# Patient Record
Sex: FEMALE | URBAN - METROPOLITAN AREA
[De-identification: names, ages, dates, MRNs, and addresses within clinical notes are randomized per-mention and may not be internally consistent; named-entity substitution may affect disease eponyms.]

---

## 2019-02-28 ENCOUNTER — APPOINTMENT (RX ONLY)
Dept: URBAN - METROPOLITAN AREA CLINIC 111 | Facility: CLINIC | Age: 34
Setting detail: DERMATOLOGY
End: 2019-02-28

## 2019-02-28 DIAGNOSIS — D18.0 HEMANGIOMA: ICD-10-CM

## 2019-02-28 PROBLEM — J30.1 ALLERGIC RHINITIS DUE TO POLLEN: Status: ACTIVE | Noted: 2019-02-28

## 2019-02-28 PROBLEM — D18.01 HEMANGIOMA OF SKIN AND SUBCUTANEOUS TISSUE: Status: ACTIVE | Noted: 2019-02-28

## 2019-02-28 PROCEDURE — ? DIOLITE 532 LASER

## 2019-02-28 ASSESSMENT — LOCATION DETAILED DESCRIPTION DERM: LOCATION DETAILED: NASAL TIP

## 2019-02-28 ASSESSMENT — LOCATION ZONE DERM: LOCATION ZONE: NOSE

## 2019-02-28 ASSESSMENT — LOCATION SIMPLE DESCRIPTION DERM: LOCATION SIMPLE: NOSE

## 2019-02-28 NOTE — PROCEDURE: DIOLITE 532 LASER
Total Pulses (Optional): 23
Price (Use Numbers Only, No Special Characters Or $): 75
Post-Procedure Care: advised to apply Vaseline and ice. Post care reviewed with patient.
Indication: vascular lesion
Power: 3
Handpiece: 1000 microns
Fluence In J/Cm2 (Optional): 17
Repetition Rate (Hz): 6
Post-Care Instructions: I reviewed with the patient in detail post-care instructions. apply Vaseline over crusty areas. Patient should stay away from the sun and wear sun protection until treated areas are fully healed.
Consent: Prior to the procedure consent was obtained, risks reviewed including but not limited to crusting, scabbing, blistering, scarring, darker or lighter pigmentary change, incidental hair removal, bruising, and/or incomplete removal.
Detail Level: Detailed

## 2019-02-28 NOTE — HPI: SKIN LESION (HEMANGIOMA(S))
Is This A New Presentation, Or A Follow-Up?: Follow Up Hemangioma(s)
Additional History: Patient presents for Diolite treatment today. She has never had this treatment done before.

## 2019-02-28 NOTE — HPI: COSMETIC (LASER RED SPOTS)
Have You Had Red Spots Treated With Laser Before?: has not had previous treatments
Additional History: Referred from Dr. Zarate

## 2023-03-06 ENCOUNTER — APPOINTMENT (RX ONLY)
Dept: URBAN - METROPOLITAN AREA CLINIC 111 | Facility: CLINIC | Age: 38
Setting detail: DERMATOLOGY
End: 2023-03-06

## 2023-03-06 DIAGNOSIS — L21.8 OTHER SEBORRHEIC DERMATITIS: ICD-10-CM

## 2023-03-06 DIAGNOSIS — L71.8 OTHER ROSACEA: ICD-10-CM

## 2023-03-06 DIAGNOSIS — Q826 OTHER SPECIFIED ANOMALIES OF SKIN: ICD-10-CM

## 2023-03-06 DIAGNOSIS — R21 RASH AND OTHER NONSPECIFIC SKIN ERUPTION: ICD-10-CM

## 2023-03-06 DIAGNOSIS — Q828 OTHER SPECIFIED ANOMALIES OF SKIN: ICD-10-CM

## 2023-03-06 DIAGNOSIS — Q819 OTHER SPECIFIED ANOMALIES OF SKIN: ICD-10-CM

## 2023-03-06 PROBLEM — L85.8 OTHER SPECIFIED EPIDERMAL THICKENING: Status: ACTIVE | Noted: 2023-03-06

## 2023-03-06 PROCEDURE — ? TREATMENT REGIMEN

## 2023-03-06 PROCEDURE — ? PRESCRIPTION MEDICATION MANAGEMENT

## 2023-03-06 PROCEDURE — ? DIAGNOSIS COMMENT

## 2023-03-06 PROCEDURE — 99204 OFFICE O/P NEW MOD 45 MIN: CPT

## 2023-03-06 PROCEDURE — ? PRESCRIPTION

## 2023-03-06 PROCEDURE — ? COUNSELING

## 2023-03-06 RX ORDER — TACROLIMUS 1 MG/G
OINTMENT TOPICAL
Qty: 30 | Refills: 1 | Status: ERX | COMMUNITY
Start: 2023-03-06

## 2023-03-06 RX ORDER — METRONIDAZOLE 10 MG/G
GEL TOPICAL
Qty: 60 | Refills: 1 | Status: ERX | COMMUNITY
Start: 2023-03-06

## 2023-03-06 RX ADMIN — METRONIDAZOLE: 10 GEL TOPICAL at 00:00

## 2023-03-06 RX ADMIN — TACROLIMUS: 1 OINTMENT TOPICAL at 00:00

## 2023-03-06 ASSESSMENT — LOCATION DETAILED DESCRIPTION DERM
LOCATION DETAILED: LEFT SUPERIOR PARIETAL SCALP
LOCATION DETAILED: LEFT PROXIMAL POSTERIOR UPPER ARM
LOCATION DETAILED: EPIGASTRIC SKIN
LOCATION DETAILED: LEFT INFERIOR MEDIAL FOREHEAD
LOCATION DETAILED: RIGHT PROXIMAL POSTERIOR UPPER ARM

## 2023-03-06 ASSESSMENT — LOCATION SIMPLE DESCRIPTION DERM
LOCATION SIMPLE: ABDOMEN
LOCATION SIMPLE: LEFT FOREHEAD
LOCATION SIMPLE: RIGHT POSTERIOR UPPER ARM
LOCATION SIMPLE: LEFT POSTERIOR UPPER ARM
LOCATION SIMPLE: SCALP

## 2023-03-06 ASSESSMENT — LOCATION ZONE DERM
LOCATION ZONE: SCALP
LOCATION ZONE: ARM
LOCATION ZONE: TRUNK
LOCATION ZONE: FACE

## 2023-03-06 NOTE — PROCEDURE: PRESCRIPTION MEDICATION MANAGEMENT
Plan: Begin: \\n- Metronidazole gel 1%: apply thin film to entire face daily, can moisturize after. Ok to alternate with retinoid at night. \\n\\n- Tacrolimus 0.1 % ointment: apply 1-2 x day to face daily. Ok to mix with cerave healing ointment/Vaseline as needed if stinging occurs \\n\\n\\nRecommended CeraVe hydrating cleanser
Detail Level: Zone
Render In Strict Bullet Format?: Yes

## 2023-03-06 NOTE — PROCEDURE: DIAGNOSIS COMMENT
Comment: Patient notes rash is similar to hives, but not present today. She has eliminated any irritants from laundry/self care, and notes rash is more present after exercising.
Detail Level: Simple
Render Risk Assessment In Note?: no

## 2023-03-06 NOTE — HPI: RASH
What Type Of Note Output Would You Prefer (Optional)?: Bullet Format
How Severe Is Your Rash?: moderate
Is This A New Presentation, Or A Follow-Up?: Rash
Additional History: Patient states she has been treating with a topical she cannot recall the name of.

## 2023-04-27 ENCOUNTER — APPOINTMENT (RX ONLY)
Dept: URBAN - METROPOLITAN AREA CLINIC 111 | Facility: CLINIC | Age: 38
Setting detail: DERMATOLOGY
End: 2023-04-27

## 2023-04-27 DIAGNOSIS — H00.01 HORDEOLUM EXTERNUM: ICD-10-CM

## 2023-04-27 DIAGNOSIS — L21.8 OTHER SEBORRHEIC DERMATITIS: ICD-10-CM

## 2023-04-27 DIAGNOSIS — L71.8 OTHER ROSACEA: ICD-10-CM | Status: INADEQUATELY CONTROLLED

## 2023-04-27 PROBLEM — H00.011 HORDEOLUM EXTERNUM RIGHT UPPER EYELID: Status: ACTIVE | Noted: 2023-04-27

## 2023-04-27 PROCEDURE — ? DIAGNOSIS COMMENT

## 2023-04-27 PROCEDURE — ? PRESCRIPTION MEDICATION MANAGEMENT

## 2023-04-27 PROCEDURE — ? PRESCRIPTION

## 2023-04-27 PROCEDURE — ? COUNSELING

## 2023-04-27 PROCEDURE — 99214 OFFICE O/P EST MOD 30 MIN: CPT

## 2023-04-27 RX ORDER — KETOCONAZOLE 20 MG/ML
SHAMPOO, SUSPENSION TOPICAL
Qty: 120 | Refills: 11 | Status: ERX | COMMUNITY
Start: 2023-04-27

## 2023-04-27 RX ORDER — DOXYCYCLINE 40 MG/1
CAPSULE ORAL
Qty: 90 | Refills: 3 | Status: ERX | COMMUNITY
Start: 2023-04-27

## 2023-04-27 RX ADMIN — DOXYCYCLINE: 40 CAPSULE ORAL at 00:00

## 2023-04-27 RX ADMIN — KETOCONAZOLE: 20 SHAMPOO, SUSPENSION TOPICAL at 00:00

## 2023-04-27 ASSESSMENT — LOCATION ZONE DERM
LOCATION ZONE: EYELID
LOCATION ZONE: SCALP
LOCATION ZONE: FACE

## 2023-04-27 ASSESSMENT — LOCATION DETAILED DESCRIPTION DERM
LOCATION DETAILED: LEFT SUPERIOR PARIETAL SCALP
LOCATION DETAILED: RIGHT MEDIAL SUPERIOR EYELID
LOCATION DETAILED: LEFT INFERIOR MEDIAL FOREHEAD

## 2023-04-27 ASSESSMENT — LOCATION SIMPLE DESCRIPTION DERM
LOCATION SIMPLE: LEFT FOREHEAD
LOCATION SIMPLE: SCALP
LOCATION SIMPLE: RIGHT SUPERIOR EYELID

## 2023-04-27 NOTE — PROCEDURE: DIAGNOSIS COMMENT
Render Risk Assessment In Note?: no
Detail Level: Simple
Comment: Irritated, referred to occuloplastics where it was injected. Not diagnosed with ocular rosacea, but irritation is likely causing more flares of rosacea

## 2023-04-27 NOTE — PROCEDURE: PRESCRIPTION MEDICATION MANAGEMENT
Plan: Discontinue: \\n- Metronidazole gel 1% (ineffective) \\n- Tacrolimus 0.1 % ointment (stinging) \\n\\nBegin: \\n- Oracea 40mg: take 1 pill once daily with food and water\\n- Rosacea compound: apply thin film to entire face once daily, follow with sunscreen and vitamin c serum ([facility])\\n\\nRecommended CeraVe hydrating cleanser
Detail Level: Zone
Render In Strict Bullet Format?: Yes
Plan: Continue: \\n- Ketoconazole shampoo: use as wash on scalp 2-3 x week. Let sit 2-5 mins prior to rinsing

## 2023-08-15 ENCOUNTER — RX ONLY (OUTPATIENT)
Age: 38
Setting detail: RX ONLY
End: 2023-08-15

## 2023-08-15 ENCOUNTER — APPOINTMENT (RX ONLY)
Dept: URBAN - METROPOLITAN AREA CLINIC 111 | Facility: CLINIC | Age: 38
Setting detail: DERMATOLOGY
End: 2023-08-15

## 2023-08-15 DIAGNOSIS — L21.8 OTHER SEBORRHEIC DERMATITIS: ICD-10-CM

## 2023-08-15 DIAGNOSIS — L71.8 OTHER ROSACEA: ICD-10-CM | Status: IMPROVED

## 2023-08-15 PROCEDURE — ? PRESCRIPTION MEDICATION MANAGEMENT

## 2023-08-15 PROCEDURE — 99214 OFFICE O/P EST MOD 30 MIN: CPT

## 2023-08-15 PROCEDURE — ? COUNSELING

## 2023-08-15 RX ORDER — TRETIONIN 0.5 MG/G
CREAM TOPICAL
Qty: 45 | Refills: 1 | Status: ERX | COMMUNITY
Start: 2023-08-15

## 2023-08-15 ASSESSMENT — LOCATION DETAILED DESCRIPTION DERM
LOCATION DETAILED: LEFT INFERIOR MEDIAL FOREHEAD
LOCATION DETAILED: LEFT SUPERIOR PARIETAL SCALP

## 2023-08-15 ASSESSMENT — LOCATION ZONE DERM
LOCATION ZONE: SCALP
LOCATION ZONE: FACE

## 2023-08-15 ASSESSMENT — LOCATION SIMPLE DESCRIPTION DERM
LOCATION SIMPLE: LEFT FOREHEAD
LOCATION SIMPLE: SCALP

## 2023-08-15 NOTE — PROCEDURE: PRESCRIPTION MEDICATION MANAGEMENT
Plan: -\\n- follow up in 6 months
Detail Level: Zone
Continue Regimen: - \\n-Rosacea compound: apply thin film to entire face once daily, follow with sunscreen and vitamin c serum ([facility]) ( alternate with tretinion ) \\n- Recommended CeraVe hydrating cleanser
Render In Strict Bullet Format?: Yes
Discontinue Regimen: -\\n- Oracea 40mg: take 1 pill once daily with food and water
Initiate Treatment: -\\n- tretinion cream: Apply a pea-sized amount in a thin layer to entire face nightly. Can start every 3rd night and increase to nightly as tolerated. OK to apply after moisturizer to reduce dryness.
Plan: Continue: \\n- Ketoconazole shampoo: use as wash on scalp 2-3 x week. Let sit 2-5 mins prior to rinsing

## 2024-03-06 ENCOUNTER — APPOINTMENT (RX ONLY)
Dept: URBAN - METROPOLITAN AREA CLINIC 111 | Facility: CLINIC | Age: 39
Setting detail: DERMATOLOGY
End: 2024-03-06

## 2024-03-06 DIAGNOSIS — L71.8 OTHER ROSACEA: ICD-10-CM | Status: IMPROVED

## 2024-03-06 DIAGNOSIS — L21.8 OTHER SEBORRHEIC DERMATITIS: ICD-10-CM | Status: IMPROVED

## 2024-03-06 DIAGNOSIS — Z41.9 ENCOUNTER FOR PROCEDURE FOR PURPOSES OTHER THAN REMEDYING HEALTH STATE, UNSPECIFIED: ICD-10-CM

## 2024-03-06 PROCEDURE — ? COSMETIC CONSULTATION: SKIN CARE PRODUCTS AND SERVICES

## 2024-03-06 PROCEDURE — ? DIAGNOSIS COMMENT

## 2024-03-06 PROCEDURE — ? PRESCRIPTION

## 2024-03-06 PROCEDURE — ? PRESCRIPTION MEDICATION MANAGEMENT

## 2024-03-06 PROCEDURE — 99214 OFFICE O/P EST MOD 30 MIN: CPT

## 2024-03-06 PROCEDURE — ? COUNSELING

## 2024-03-06 RX ORDER — DOXYCYCLINE HYCLATE 60 MG/1
TABLET, DELAYED RELEASE ORAL
Qty: 60 | Refills: 1 | Status: ERX | COMMUNITY
Start: 2024-03-06

## 2024-03-06 RX ADMIN — DOXYCYCLINE HYCLATE: 60 TABLET, DELAYED RELEASE ORAL at 00:00

## 2024-03-06 ASSESSMENT — LOCATION SIMPLE DESCRIPTION DERM
LOCATION SIMPLE: SCALP
LOCATION SIMPLE: LEFT FOREHEAD

## 2024-03-06 ASSESSMENT — LOCATION DETAILED DESCRIPTION DERM
LOCATION DETAILED: LEFT INFERIOR MEDIAL FOREHEAD
LOCATION DETAILED: LEFT SUPERIOR PARIETAL SCALP

## 2024-03-06 ASSESSMENT — LOCATION ZONE DERM
LOCATION ZONE: SCALP
LOCATION ZONE: FACE

## 2024-03-06 NOTE — PROCEDURE: DIAGNOSIS COMMENT
Detail Level: Simple
Render Risk Assessment In Note?: no
Comment: Patient reports redness in the eyes, it might be related to ocular rosacea but recommended consulting with an ophthalmologist. Discussed treatment such as Doryx that could help with eye redness.

## 2024-03-06 NOTE — HPI: OTHER
Condition:: Eye redness
Please Describe Your Condition:: Patient states her eyes have been red off and on for about a year. She states she sometimes get bumps on the las line and that it feels like there is something in her eye. She states she has used OTC allergy drops and she states she used antibiotic ointment in her eyes with no improvement. She is unsure if this is related to the rosacea.

## 2024-03-06 NOTE — PROCEDURE: PRESCRIPTION MEDICATION MANAGEMENT
Plan: -\\n- follow up in 6 months
Detail Level: Zone
Continue Regimen: - \\n- tretinion cream: Apply a pea-sized amount in a thin layer to entire face nightly. Can start every 3rd night and increase to nightly as tolerated. OK to apply after moisturizer to reduce dryness. (RF deferred)\\n-Rosacea compound: apply thin film to entire face once daily, follow with sunscreen and vitamin c serum ([facility]) ( alternate with tretinion ) (RF deferred)\\n- Recommended CeraVe hydrating cleanser
Render In Strict Bullet Format?: Yes
Initiate Treatment: -\\n- Doryx 60mg: take one pill once daily\\n- Probiotic while on antibiotic
Continue Regimen: -\\n- Ketoconazole shampoo: use as wash on scalp 2-3 x week. Let sit 2-5 mins prior to rinsing (RF deferred)

## 2024-11-19 ENCOUNTER — APPOINTMENT (RX ONLY)
Dept: URBAN - METROPOLITAN AREA CLINIC 111 | Facility: CLINIC | Age: 39
Setting detail: DERMATOLOGY
End: 2024-11-19

## 2024-11-19 DIAGNOSIS — Z41.9 ENCOUNTER FOR PROCEDURE FOR PURPOSES OTHER THAN REMEDYING HEALTH STATE, UNSPECIFIED: ICD-10-CM

## 2024-11-19 PROCEDURE — ? HYDRAFACIAL

## 2024-11-19 PROCEDURE — ? DIAGNOSIS COMMENT

## 2024-11-19 NOTE — PROCEDURE: HYDRAFACIAL
Vacuum Pressure Low Setting (Will Not Render If Set To 0): 16
Vacuum Pressure Low Setting (Will Not Render If Set To 0): 22
Procedure: Peel
Vacuum Pressure High Setting (Will Not Render If Set To 0): 0
Treatment Number: 1
Price (Use Numbers Only, No Special Characters Or $): 200
Number Of Passes Step 4: 2
Indication: dehydrated skin
Solution Override
Tip: Hydropeel Tip, Teal
Tip: Hydropeel Tip, Clear
Solution: Beta-HD
Procedure: Fusion
Procedure: Extraction
Vacuum Pressure Low Setting (Will Not Render If Set To 0): 25
Tip: Hydropeel Tip, Blue
Consent: Verbal consent obtained, risks reviewed including but not limited to crusting, scabbing, blistering, scarring, darker or lighter pigmentary change, bruising, and/or incomplete response.
Procedure: Exfoliation
Vacuum Pressure Low Setting (Will Not Render If Set To 0): 15
Post-Care Instructions: I reviewed with the patient in detail post-care instructions. Patient should stay away from the sun and wear sun protection until treated areas are fully healed.
Solution Override: see boost
Location: face
Number Of Passes Step 5: 4
Solution: Activ-4
Solution: Antiox-6
Stroke (Optional): SCALP EXFOLIATION

## 2024-11-19 NOTE — PROCEDURE: DIAGNOSIS COMMENT
Detail Level: Detailed
Render Risk Assessment In Note?: no
Comment: Patient purchased a package of 3 FYBBL (gets hands free).  We will start slow at lower settings - do base pass then pigment spots.  After first we can see if we need to increase settings.

## 2024-12-06 ENCOUNTER — APPOINTMENT (RX ONLY)
Dept: URBAN - METROPOLITAN AREA CLINIC 111 | Facility: CLINIC | Age: 39
Setting detail: DERMATOLOGY
End: 2024-12-06

## 2024-12-06 DIAGNOSIS — Z41.9 ENCOUNTER FOR PROCEDURE FOR PURPOSES OTHER THAN REMEDYING HEALTH STATE, UNSPECIFIED: ICD-10-CM

## 2024-12-06 PROCEDURE — ? FOREVER YOUNG BBL

## 2024-12-06 NOTE — PROCEDURE: FOREVER YOUNG BBL
Fluence (J/Cm2) - Will Not Render If 0: 8
Skin Type: V
Pulse Width Units: milliseconds
Technique: Static
Spot Size: Finesse Adapter Size: 3 mm round
Pulse Width - Will Not Render If 0: 40
Filter: 515nm Filter
Add Setting 2?: yes
Detail Level: Zone
External Cooling Fan Speed: 0
Passes (Optional): Pigment settings #1
Filter: 695nm Filter
Comments: Treating as a skin type V - will reduce to a 4 if well tolerated.
Spot Size: Finesse Adapter Size: 15 x 15 mm square
Temp (C): 15
Fluence (J/Cm2) - Will Not Render If 0: 14
Topical Anesthesia Type: BLT gel (benzocaine 20%, lidocaine 6%, tetracaine 4%)
Add On Skintyte?: no
Total Pulses (Optional): 332
Temp (C): 10
Pulse Width - Will Not Render If 0: 19
Price (Use Numbers Only, No Special Characters Or $): 400
Consent: Written consent obtained.  Risks reviewed including but not limited to crusting, scabbing, blistering, scarring, darker or lighter pigmentary change, and incomplete clearance.
Anesthesia Type: 1% lidocaine with epinephrine
Spot Size: Finesse Adapter Size: 15 x 45 mm (No Finesse Adapter)
Filter: 590nm Filter
Procedure Note: After applying gel and applying protective eyeware, treatment was administered using the setting parameters listed above.
Treatment Number: 1
Post-Care Instructions: I reviewed with the patient in detail post-care instructions. Patient should avoid sun exposure before and after treatment.  Patient should wear sunscreen.
Number Of Prepaid Treatments (Will Not Render If 0): 3
Passes (Optional): BASE

## 2025-01-03 ENCOUNTER — APPOINTMENT (OUTPATIENT)
Dept: URBAN - METROPOLITAN AREA CLINIC 111 | Facility: CLINIC | Age: 40
Setting detail: DERMATOLOGY
End: 2025-01-03

## 2025-01-03 DIAGNOSIS — Z41.9 ENCOUNTER FOR PROCEDURE FOR PURPOSES OTHER THAN REMEDYING HEALTH STATE, UNSPECIFIED: ICD-10-CM

## 2025-01-03 PROCEDURE — ? FOREVER YOUNG BBL

## 2025-01-03 NOTE — PROCEDURE: FOREVER YOUNG BBL
Fluence (J/Cm2) - Will Not Render If 0: 8
Skin Type: V
Pulse Width Units: milliseconds
Technique: Static
Spot Size: Finesse Adapter Size: 3 mm round
Pulse Width - Will Not Render If 0: 40
Filter: 590nm Filter
Add Setting 2?: yes
Detail Level: Zone
External Cooling Fan Speed: 0
Passes (Optional): FEW VESSELS
Filter: 640nm Filter
Comments: Treating as a skin type V - will reduce to a 4 if well tolerated.
Spot Size: Finesse Adapter Size: 15 x 15 mm square
Temp (C): 17
Fluence (J/Cm2) - Will Not Render If 0: 20
Topical Anesthesia Type: BLT gel (benzocaine 20%, lidocaine 6%, tetracaine 4%)
Add On Skintyte?: no
Temp (C): 10
Pulse Width - Will Not Render If 0: 30
Price (Use Numbers Only, No Special Characters Or $): 400
Consent: Written consent obtained.  Risks reviewed including but not limited to crusting, scabbing, blistering, scarring, darker or lighter pigmentary change, and incomplete clearance.
Anesthesia Type: 1% lidocaine with epinephrine
Spot Size: Finesse Adapter Size: 15 x 45 mm (No Finesse Adapter)
Filter: 560nm Filter
Procedure Note: After applying gel and applying protective eyeware, treatment was administered using the setting parameters listed above.
Treatment Number: 2
Passes (Optional): PIGMENT #2
Fluence (J/Cm2) - Will Not Render If 0: 13
Post-Care Instructions: I reviewed with the patient in detail post-care instructions. Patient should avoid sun exposure before and after treatment.  Patient should wear sunscreen.
Number Of Prepaid Treatments (Will Not Render If 0): 3
Total Pulses (Optional): 255
Pulse Width - Will Not Render If 0: 19
Passes (Optional): BASE

## 2025-01-31 ENCOUNTER — APPOINTMENT (OUTPATIENT)
Dept: URBAN - METROPOLITAN AREA CLINIC 111 | Facility: CLINIC | Age: 40
Setting detail: DERMATOLOGY
End: 2025-01-31

## 2025-01-31 DIAGNOSIS — Z41.9 ENCOUNTER FOR PROCEDURE FOR PURPOSES OTHER THAN REMEDYING HEALTH STATE, UNSPECIFIED: ICD-10-CM

## 2025-01-31 PROCEDURE — ? DIAGNOSIS COMMENT

## 2025-01-31 PROCEDURE — ? FOREVER YOUNG BBL

## 2025-01-31 NOTE — PROCEDURE: DIAGNOSIS COMMENT
Render Risk Assessment In Note?: no
Detail Level: Detailed
Comment: If a few spots need to be cleaned up on face - can do at no charge in 4 weeks.

## 2025-01-31 NOTE — PROCEDURE: FOREVER YOUNG BBL
Fluence (J/Cm2) - Will Not Render If 0: 8
Skin Type: V
Pulse Width Units: milliseconds
Technique: Static
Spot Size: Finesse Adapter Size: 3 mm round
Pulse Width - Will Not Render If 0: 40
Filter: 590nm Filter
Add Setting 2?: yes
Detail Level: Zone
External Cooling Fan Speed: 0
Passes (Optional): 3rd pigment
Filter: 640nm Filter
Comments: Treating as a skin type V - will reduce to a 4 if well tolerated.
Spot Size: Finesse Adapter Size: 15 x 15 mm square
Temp (C): 17
Fluence (J/Cm2) - Will Not Render If 0: 15
Topical Anesthesia Type: BLT gel (benzocaine 20%, lidocaine 6%, tetracaine 4%)
Add On Skintyte?: no
Total Pulses (Optional): 277
Temp (C): 10
Price (Use Numbers Only, No Special Characters Or $): 400
Consent: Written consent obtained.  Risks reviewed including but not limited to crusting, scabbing, blistering, scarring, darker or lighter pigmentary change, and incomplete clearance.
Anesthesia Type: 1% lidocaine with epinephrine
Spot Size: Finesse Adapter Size: 15 x 45 mm (No Finesse Adapter)
Filter: 515nm Filter
Procedure Note: After applying gel and applying protective eyeware, treatment was administered using the setting parameters listed above.
Treatment Number: 3
Passes (Optional): PIGMENT #2
Fluence (J/Cm2) - Will Not Render If 0: 13
Post-Care Instructions: I reviewed with the patient in detail post-care instructions. Patient should avoid sun exposure before and after treatment.  Patient should wear sunscreen.
Pulse Width - Will Not Render If 0: 19
Passes (Optional): BASE

## 2025-04-29 ENCOUNTER — APPOINTMENT (OUTPATIENT)
Dept: URBAN - METROPOLITAN AREA CLINIC 111 | Facility: CLINIC | Age: 40
Setting detail: DERMATOLOGY
End: 2025-04-29

## 2025-04-29 ENCOUNTER — RX ONLY (RX ONLY)
Age: 40
End: 2025-04-29

## 2025-04-29 DIAGNOSIS — Z41.9 ENCOUNTER FOR PROCEDURE FOR PURPOSES OTHER THAN REMEDYING HEALTH STATE, UNSPECIFIED: ICD-10-CM

## 2025-04-29 PROCEDURE — ? FOREVER YOUNG BBL

## 2025-04-29 PROCEDURE — ? DIAGNOSIS COMMENT

## 2025-04-29 NOTE — PROCEDURE: FOREVER YOUNG BBL
Technique: Static
Fluence (J/Cm2) - Will Not Render If 0: 13
Add Setting 3?: no
Consent: Written consent obtained.  Risks reviewed including but not limited to crusting, scabbing, blistering, scarring, darker or lighter pigmentary change, and incomplete clearance.
Anesthesia Type: 1% lidocaine with epinephrine
Pulse Width - Will Not Render If 0: 15
Fluence (J/Cm2) - Will Not Render If 0: 0
Pulse Width Units: milliseconds
Skin Type: IV
Filter: 590nm Filter
Detail Level: Detailed
Filter: 515nm Filter
Procedure Note: After applying gel and applying protective eyeware, treatment was administered using the setting parameters listed above.
Spot Size: Finesse Adapter Size: 3 mm round
Post-Care Instructions: I reviewed with the patient in detail post-care instructions. Patient should avoid sun exposure before and after treatment.  Patient should wear sunscreen.
Temp (C): 20
Topical Anesthesia Type: 23% lidocaine, 7% tetracaine, 2% phenylephrine
Total Pulses (Optional): 40
Spot Size: Finesse Adapter Size: 15 x 15 mm square

## 2025-04-29 NOTE — PROCEDURE: DIAGNOSIS COMMENT
Detail Level: Detailed
Comment: Patient looks to have some possible melasma under both eyes that have popped up since her 3 sessions of BBL.  Showed pictures to Dr. Funes and she agrees.  She advised that we send 12%hq/6% Kojic acid for patient to start nightly.  Did some light BBL touch up and patient to start compound this weekend.  We will check progress in 5 weeks.
Render Risk Assessment In Note?: no

## 2025-06-02 ENCOUNTER — APPOINTMENT (OUTPATIENT)
Dept: URBAN - METROPOLITAN AREA CLINIC 111 | Facility: CLINIC | Age: 40
Setting detail: DERMATOLOGY
End: 2025-06-02

## 2025-06-02 DIAGNOSIS — Z41.9 ENCOUNTER FOR PROCEDURE FOR PURPOSES OTHER THAN REMEDYING HEALTH STATE, UNSPECIFIED: ICD-10-CM

## 2025-06-02 PROCEDURE — ? DIAGNOSIS COMMENT

## 2025-06-02 NOTE — PROCEDURE: DIAGNOSIS COMMENT
Detail Level: Detailed
Comment: Huge improvement from last visit - brown patches have resolved and patient is very happy.  Pic taken today.
Render Risk Assessment In Note?: no